# Patient Record
Sex: FEMALE | Race: BLACK OR AFRICAN AMERICAN | Employment: UNEMPLOYED | URBAN - METROPOLITAN AREA
[De-identification: names, ages, dates, MRNs, and addresses within clinical notes are randomized per-mention and may not be internally consistent; named-entity substitution may affect disease eponyms.]

---

## 2023-08-31 ENCOUNTER — HOSPITAL ENCOUNTER (EMERGENCY)
Age: 58
Discharge: HOME | End: 2023-08-31
Attending: EMERGENCY MEDICINE
Payer: MEDICAID

## 2023-08-31 ENCOUNTER — APPOINTMENT (OUTPATIENT)
Dept: GENERAL RADIOLOGY | Age: 58
End: 2023-08-31
Payer: MEDICAID

## 2023-08-31 ENCOUNTER — APPOINTMENT (OUTPATIENT)
Dept: VASCULAR LAB | Age: 58
End: 2023-08-31
Payer: MEDICAID

## 2023-08-31 VITALS
HEIGHT: 65 IN | BODY MASS INDEX: 34.99 KG/M2 | HEART RATE: 80 BPM | SYSTOLIC BLOOD PRESSURE: 135 MMHG | WEIGHT: 210 LBS | DIASTOLIC BLOOD PRESSURE: 86 MMHG | RESPIRATION RATE: 16 BRPM | OXYGEN SATURATION: 96 % | TEMPERATURE: 98.1 F

## 2023-08-31 DIAGNOSIS — M79.604 RIGHT LEG PAIN: Primary | ICD-10-CM

## 2023-08-31 DIAGNOSIS — K08.89 PAIN, DENTAL: ICD-10-CM

## 2023-08-31 LAB — D DIMER PPP FEU-MCNC: 0.5 UG/ML FEU (ref 0–0.59)

## 2023-08-31 PROCEDURE — 73610 X-RAY EXAM OF ANKLE: CPT

## 2023-08-31 PROCEDURE — 36415 COLL VENOUS BLD VENIPUNCTURE: CPT

## 2023-08-31 PROCEDURE — 85379 FIBRIN DEGRADATION QUANT: CPT

## 2023-08-31 PROCEDURE — 93971 EXTREMITY STUDY: CPT

## 2023-08-31 PROCEDURE — 99284 EMERGENCY DEPT VISIT MOD MDM: CPT

## 2023-08-31 RX ORDER — CLINDAMYCIN HYDROCHLORIDE 150 MG/1
300 CAPSULE ORAL 3 TIMES DAILY
Qty: 60 CAPSULE | Refills: 0 | Status: SHIPPED | OUTPATIENT
Start: 2023-08-31 | End: 2023-09-10

## 2023-08-31 ASSESSMENT — PAIN SCALES - GENERAL: PAINLEVEL_OUTOF10: 5

## 2023-08-31 ASSESSMENT — ENCOUNTER SYMPTOMS
SORE THROAT: 0
SHORTNESS OF BREATH: 0
TROUBLE SWALLOWING: 0
COLOR CHANGE: 0
VOMITING: 0
BACK PAIN: 0
NAUSEA: 0
FACIAL SWELLING: 0

## 2023-08-31 ASSESSMENT — PAIN DESCRIPTION - LOCATION: LOCATION: LEG;TEETH

## 2023-08-31 ASSESSMENT — PAIN DESCRIPTION - FREQUENCY: FREQUENCY: INTERMITTENT

## 2023-08-31 ASSESSMENT — PAIN - FUNCTIONAL ASSESSMENT: PAIN_FUNCTIONAL_ASSESSMENT: 0-10

## 2023-08-31 ASSESSMENT — PAIN DESCRIPTION - DESCRIPTORS: DESCRIPTORS: SHARP

## 2023-08-31 NOTE — ED PROVIDER NOTES
[facility] ED  eMERGENCY dEPARTMENT eNCOUnter      Pt Name: Ashley Kelly  MRN: [id number]  [address] [facility] 1965  Date of evaluation: 8/31/2023  Provider: ELLIE Zurita CNP    CHIEF COMPLAINT       Chief Complaint   Patient presents with    Leg Pain     Right lower leg    Dental Pain     Dentist  appt in Dec         HISTORY OF PRESENT ILLNESS  (Location/Symptom, Timing/Onset, Context/Setting, Quality, Duration, Modifying Factors, Severity.)   Ashley Kelly is a 59 y.o. female who presents to the emergency department. C/o pain to her right lower leg, right ankle. Onset was 1-2 days ago. States she felt a sharp pain. Also reports dental pain; she is concerned about an infection and is requesting a prescription for clindamycin. Denies fever, chills, injury, SOB, difficulty swallowing. Denies CP, dizziness. Rates her pain 5/10. Nursing Notes were reviewed. ALLERGIES     Keflet [cephalexin] and Toradol [ketorolac tromethamine]    CURRENT MEDICATIONS       Previous Medications    ACETAMINOPHEN 650 MG TABS    Take 650 mg by mouth every 6 hours as needed for Pain. HYDROCHLOROTHIAZIDE (HYDRODIURIL) 12.5 MG TABLET    Take 1 tablet by mouth daily. MELOXICAM (MOBIC PO)    Take by mouth daily       PAST MEDICAL HISTORY         Diagnosis Date    Arthritis        SURGICAL HISTORY           Procedure Laterality Date    FACIAL RECONSTRUCTION SURGERY      KNEE SURGERY Right     SHOULDER SURGERY Left          FAMILY HISTORY     History reviewed. No pertinent family history. No family status information on file. SOCIAL HISTORY      reports that she has been smoking cigarettes. She has never been exposed to tobacco smoke. She does not have any smokeless tobacco history on file. She reports current alcohol use. She reports that she does not use drugs.     REVIEW OF SYSTEMS    (2-9 systems for level 4, 10 or more for level 5)     Review of Systems   Constitutional:  Negative for chills,

## 2023-08-31 NOTE — ED NOTES
Patient presents to ED with right lower, posterior leg pain below her calf, concerned for clot as she had a right knee arthroplasty a little while ago. No severe pain to leg on palpation and no notable swelling or redness. Patient also c/o dental pain, has a decayed tooth and cannot get into new dentist until December. Patient states her family NP had put her on a course of Clindamycin, but she has completed that and tooth is still painful.       Asif Fisher., RN  85/91/87 [id number]

## 2023-08-31 NOTE — ED PROVIDER NOTES
EMERGENCY DEPARTMENT ENCOUNTER   ATTENDING ATTESTATION     Pt Name: Kevan Rivera  MRN: [id number]  [address] [facility] [place] 1965  Date of evaluation: 8/31/23   Kevan Rivera is a 59 y.o. female with CC: Leg Pain (Right lower leg) and Dental Pain (Dentist  appt in Dec)    MDM:   Patient is a 26-year-old female who presented to the emergency department with right leg pain and dental pain. DVT negative on ultrasound. X-ray also negative. Discharged with outpatient follow-up and parameters to return to the emergency room or  This visit was performed by both a physician and an APC. I personally evaluated and examined the patient. I performed all aspects of the MDM as documented. CRITICAL CARE:       EKG: All EKG's are interpreted by the Emergency Department Physician who either signs or Co-signs this chart in the absence of a cardiologist.      RADIOLOGY:All plain film, CT, MRI, and formal ultrasound images (except ED bedside ultrasound) are read by the radiologist, see reports below, unless otherwise noted in MDM or here. Vascular duplex lower extremity venous right         XR ANKLE RIGHT (MIN 3 VIEWS)   Preliminary Result   No acute fracture, dislocation or malalignment. Mild soft tissue swelling   over the lateral malleolus. Ankle mortise normal and symmetrical.  Plantar   calcaneal spur. LABS: All lab results were reviewed by myself, and all abnormals are listed below. Labs Reviewed   D-DIMER, QUANTITATIVE     CONSULTS:  None  FINAL IMPRESSION      1. Right leg pain    2. Pain, dental            PASTMEDICAL HISTORY     Past Medical History:   Diagnosis Date    Arthritis      SURGICAL HISTORY       Past Surgical History:   Procedure Laterality Date    FACIAL RECONSTRUCTION SURGERY      KNEE SURGERY Right     SHOULDER SURGERY Left      CURRENT MEDICATIONS       Previous Medications    ACETAMINOPHEN 650 MG TABS    Take 650 mg by mouth every 6 hours as needed for Pain.     HYDROCHLOROTHIAZIDE

## 2023-09-01 LAB — ECHO BSA: 2.09 M2

## 2023-10-03 ENCOUNTER — HOSPITAL ENCOUNTER (EMERGENCY)
Age: 58
Discharge: HOME | End: 2023-10-03
Attending: EMERGENCY MEDICINE
Payer: MEDICAID

## 2023-10-03 ENCOUNTER — APPOINTMENT (OUTPATIENT)
Dept: GENERAL RADIOLOGY | Age: 58
End: 2023-10-03
Payer: MEDICAID

## 2023-10-03 VITALS
OXYGEN SATURATION: 97 % | RESPIRATION RATE: 16 BRPM | HEART RATE: 84 BPM | TEMPERATURE: 98.1 F | SYSTOLIC BLOOD PRESSURE: 142 MMHG | WEIGHT: 209 LBS | BODY MASS INDEX: 34.78 KG/M2 | DIASTOLIC BLOOD PRESSURE: 92 MMHG

## 2023-10-03 DIAGNOSIS — M25.512 LEFT SHOULDER PAIN, UNSPECIFIED CHRONICITY: Primary | ICD-10-CM

## 2023-10-03 PROCEDURE — 73030 X-RAY EXAM OF SHOULDER: CPT

## 2023-10-03 PROCEDURE — 99283 EMERGENCY DEPT VISIT LOW MDM: CPT

## 2023-10-03 PROCEDURE — 6370000000 HC RX 637 (ALT 250 FOR IP): Performed by: NURSE PRACTITIONER

## 2023-10-03 PROCEDURE — 6360000002 HC RX W HCPCS: Performed by: NURSE PRACTITIONER

## 2023-10-03 RX ORDER — NAPROXEN 250 MG/1
375 TABLET ORAL ONCE
Status: COMPLETED | OUTPATIENT
Start: 2023-10-03 | End: 2023-10-03

## 2023-10-03 RX ORDER — HYDROCODONE BITARTRATE AND ACETAMINOPHEN 5; 325 MG/1; MG/1
1 TABLET ORAL EVERY 8 HOURS PRN
Qty: 9 TABLET | Refills: 0 | Status: SHIPPED | OUTPATIENT
Start: 2023-10-03 | End: 2023-10-06

## 2023-10-03 RX ADMIN — NAPROXEN 375 MG: 250 TABLET ORAL at 14:27

## 2023-10-03 RX ADMIN — DEXAMETHASONE 6 MG: 2 TABLET ORAL at 14:27

## 2023-10-03 ASSESSMENT — PAIN DESCRIPTION - FREQUENCY: FREQUENCY: CONTINUOUS

## 2023-10-03 ASSESSMENT — ENCOUNTER SYMPTOMS
COLOR CHANGE: 0
SHORTNESS OF BREATH: 0

## 2023-10-03 ASSESSMENT — PAIN - FUNCTIONAL ASSESSMENT: PAIN_FUNCTIONAL_ASSESSMENT: 0-10

## 2023-10-03 ASSESSMENT — PAIN SCALES - GENERAL: PAINLEVEL_OUTOF10: 10

## 2023-10-03 ASSESSMENT — PAIN DESCRIPTION - ORIENTATION: ORIENTATION: RIGHT

## 2023-10-03 ASSESSMENT — PAIN DESCRIPTION - LOCATION: LOCATION: SHOULDER

## 2023-10-03 ASSESSMENT — PAIN DESCRIPTION - DESCRIPTORS: DESCRIPTORS: SHARP

## 2023-10-03 NOTE — ED NOTES
Pt to er with c/o left shoulder pain. Pt states she has to have her rotator cuff fixed. Pt states she has old injury from 15 years ago to shoulder. Pt has swelli\ng noted to left shoulder. Pt states she always has some swelling but it is worse. Pt denies new known injury. Pt a&ox3. Skin warm and dry. Respirations even and non-labored.        Juan Manuel Kimbrough RN  10/03/23 [id number]

## 2024-05-19 ENCOUNTER — HOSPITAL ENCOUNTER (EMERGENCY)
Age: 59
Discharge: HOME | End: 2024-05-19
Attending: EMERGENCY MEDICINE
Payer: MEDICAID

## 2024-05-19 VITALS
SYSTOLIC BLOOD PRESSURE: 127 MMHG | TEMPERATURE: 98.9 F | OXYGEN SATURATION: 97 % | HEART RATE: 85 BPM | RESPIRATION RATE: 12 BRPM | DIASTOLIC BLOOD PRESSURE: 80 MMHG

## 2024-05-19 DIAGNOSIS — R09.81 NASAL CONGESTION: Primary | ICD-10-CM

## 2024-05-19 PROCEDURE — 99283 EMERGENCY DEPT VISIT LOW MDM: CPT

## 2024-05-19 RX ORDER — FLUTICASONE PROPIONATE 50 MCG
1 SPRAY, SUSPENSION (ML) NASAL DAILY
Qty: 32 G | Refills: 0 | Status: SHIPPED | OUTPATIENT
Start: 2024-05-19 | End: 2024-05-26

## 2024-05-19 ASSESSMENT — PAIN SCALES - GENERAL: PAINLEVEL_OUTOF10: 4

## 2024-05-19 ASSESSMENT — PAIN - FUNCTIONAL ASSESSMENT: PAIN_FUNCTIONAL_ASSESSMENT: 0-10

## 2024-05-19 NOTE — ED PROVIDER NOTES
[facility] ED  eMERGENCY dEPARTMENT eNCOUnter      Pt Name: Maggi Cadet  MRN: [id number]  Birthdate 1965  Date of evaluation: 5/19/2024  Provider: ELLIE Fernandez CNP    CHIEF COMPLAINT       Chief Complaint   Patient presents with    Nasal Congestion    Cough         HISTORY OF PRESENT ILLNESS  (Location/Symptom, Timing/Onset, Context/Setting, Quality, Duration, Modifying Factors, Severity.)   Maggi Cadet is a 58 y.o. female who presents to the emergency department for evaluation of nasal congestion rhinorrhea and a cough that started this morning.  Patient states she had her daughters last night and had the air conditioning blowing on her which may have caused her symptoms.  No chest pain or shortness of breath.  Initially she had a little sore throat but that subsided.      Nursing Notes were reviewed.    ALLERGIES     Keflet [cephalexin] and Toradol [ketorolac tromethamine]    CURRENT MEDICATIONS       Previous Medications    ACETAMINOPHEN 650 MG TABS    Take 650 mg by mouth every 6 hours as needed for Pain.    AMOXICILLIN PO    Take by mouth    HYDROCHLOROTHIAZIDE (HYDRODIURIL) 12.5 MG TABLET    Take 1 tablet by mouth daily.    MELOXICAM (MOBIC PO)    Take by mouth daily       PAST MEDICAL HISTORY         Diagnosis Date    Arthritis        SURGICAL HISTORY           Procedure Laterality Date    FACIAL RECONSTRUCTION SURGERY      KNEE SURGERY Right     SHOULDER SURGERY Left          FAMILY HISTORY     History reviewed. No pertinent family history.  No family status information on file.        SOCIAL HISTORY      reports that she has been smoking cigarettes. She has never been exposed to tobacco smoke. She does not have any smokeless tobacco history on file. She reports current alcohol use. She reports that she does not use drugs.    REVIEW OF SYSTEMS    (2-9 systems for level 4, 10 or more for level 5)   Review of Systems   HENT:  Positive for congestion, rhinorrhea and sinus

## 2024-05-19 NOTE — ED NOTES
Here today with nasal congestion, sore throat, and productive cough of thick green secretions. Denies nausea, vomiting, diarrhea, fever, chills, chest pain, SOB, nor dyspnea. Has history of bronchitis. Last use of inhaler one month ago. Notes sore throat \"with my mouth feeling yucky, as if I have a bacterial infection. My right upper tooth in front of molars had work done by a dentist two months ago.I was supposed to get a cap, but he hurt my mouth and I just couldn't go back. Tooth has a filled area over it.\"

## 2024-05-19 NOTE — DISCHARGE INSTRUCTIONS
Take medication as prescribed.  Follow-up with your primary care provider.  Return to emergency department for worsening symptoms

## 2024-05-22 ENCOUNTER — APPOINTMENT (OUTPATIENT)
Dept: GENERAL RADIOLOGY | Age: 59
End: 2024-05-22
Payer: MEDICAID

## 2024-05-22 ENCOUNTER — HOSPITAL ENCOUNTER (EMERGENCY)
Age: 59
Discharge: HOME | End: 2024-05-22
Attending: EMERGENCY MEDICINE
Payer: MEDICAID

## 2024-05-22 VITALS
WEIGHT: 211 LBS | HEART RATE: 90 BPM | HEIGHT: 65 IN | RESPIRATION RATE: 20 BRPM | BODY MASS INDEX: 35.16 KG/M2 | SYSTOLIC BLOOD PRESSURE: 144 MMHG | OXYGEN SATURATION: 96 % | TEMPERATURE: 98.6 F | DIASTOLIC BLOOD PRESSURE: 102 MMHG

## 2024-05-22 DIAGNOSIS — J06.9 ACUTE UPPER RESPIRATORY INFECTION: Primary | ICD-10-CM

## 2024-05-22 LAB
FLUAV RNA RESP QL NAA+PROBE: NOT DETECTED
FLUBV RNA RESP QL NAA+PROBE: NOT DETECTED
SARS-COV-2 RNA RESP QL NAA+PROBE: NOT DETECTED
SOURCE: NORMAL
SPECIMEN DESCRIPTION: NORMAL
SPECIMEN SOURCE: NORMAL
STREP A, MOLECULAR: NEGATIVE

## 2024-05-22 PROCEDURE — 99284 EMERGENCY DEPT VISIT MOD MDM: CPT

## 2024-05-22 PROCEDURE — 71045 X-RAY EXAM CHEST 1 VIEW: CPT

## 2024-05-22 PROCEDURE — 87651 STREP A DNA AMP PROBE: CPT

## 2024-05-22 PROCEDURE — 87636 SARSCOV2 & INF A&B AMP PRB: CPT

## 2024-05-22 ASSESSMENT — ENCOUNTER SYMPTOMS
BACK PAIN: 0
CHEST TIGHTNESS: 0
VOICE CHANGE: 0
COUGH: 1
NAUSEA: 0
TROUBLE SWALLOWING: 0
SHORTNESS OF BREATH: 0
EYE PAIN: 0
ABDOMINAL PAIN: 0
FACIAL SWELLING: 0
VOMITING: 0
PHOTOPHOBIA: 0
COLOR CHANGE: 0

## 2024-05-22 ASSESSMENT — PAIN - FUNCTIONAL ASSESSMENT: PAIN_FUNCTIONAL_ASSESSMENT: 0-10

## 2024-05-22 ASSESSMENT — PAIN SCALES - GENERAL: PAINLEVEL_OUTOF10: 6

## 2024-05-22 NOTE — ED PROVIDER NOTES
EMERGENCY DEPARTMENT ENCOUNTER    Pt Name: Maggi Cadet  MRN: [id number]  Birthdate 1965  Date of evaluation: 5/22/24  CHIEF COMPLAINT       Chief Complaint   Patient presents with    Nasal Congestion     X4 days. Came here and was given nasal spray which helped slightly, but feels she needs antibiotics for bronchitis.     Pharyngitis     HISTORY OF PRESENT ILLNESS   58-year-old female presenting to the ER complaining of nasal congestion and a cough producing phlegm since Sunday.  Patient states she was in the ER on Sunday and states that it has not gotten better since then.  Patient states the nurse and her preop for surgery that she was supposed to have done today told her that she needed an antibiotic.  She was not however prescribed an antibiotic.    The history is provided by the patient.   URI  Presenting symptoms: congestion and cough    Presenting symptoms: no fatigue    Associated symptoms: no arthralgias and no headaches            REVIEW OF SYSTEMS     Review of Systems   Constitutional:  Negative for activity change, appetite change and fatigue.   HENT:  Positive for congestion. Negative for facial swelling, trouble swallowing and voice change.    Eyes:  Negative for photophobia and pain.   Respiratory:  Positive for cough. Negative for chest tightness and shortness of breath.    Cardiovascular:  Negative for chest pain and palpitations.   Gastrointestinal:  Negative for abdominal pain, nausea and vomiting.   Genitourinary:  Negative for dysuria and urgency.   Musculoskeletal:  Negative for arthralgias and back pain.   Skin:  Negative for color change and rash.   Neurological:  Negative for dizziness, syncope and headaches.   Psychiatric/Behavioral:  Negative for behavioral problems and hallucinations.      PASTMEDICAL HISTORY     Past Medical History:   Diagnosis Date    Arthritis      Past Problem List  There is no problem list on file for this patient.    SURGICAL HISTORY       Past Surgical

## 2024-05-24 NOTE — ED PROVIDER NOTES
Pt Name: Maggi Cadet  MRN: [id number]  Birthdate 1965  Date of evaluation: 5/24/24   Maggi Cadet is a 58 y.o. female with CC: Nasal Congestion and Cough    MDM:   I performed a substantive part of the MDM during the patient's E/M visit. I personally made or approved the documented management plan and acknowledge its risk of complications.           CRITICAL CARE:       EKG: All EKG's are interpreted by the Emergency Department Physician who either signs or Co-signs this chart in the absence of a cardiologist.      RADIOLOGY:All plain film, CT, MRI, and formal ultrasound images (except ED bedside ultrasound) are read by the radiologist, see reports below, unless otherwise noted in MDM or here.  No orders to display     LABS: All lab results were reviewed by myself, and all abnormals are listed below.  Labs Reviewed - No data to display  CONSULTS:  None  FINAL IMPRESSION      1. Nasal congestion            PASTMEDICAL HISTORY     Past Medical History:   Diagnosis Date    Arthritis      SURGICAL HISTORY       Past Surgical History:   Procedure Laterality Date    FACIAL RECONSTRUCTION SURGERY      KNEE SURGERY Right     SHOULDER SURGERY Left      CURRENT MEDICATIONS       Discharge Medication List as of 5/19/2024  7:25 PM        CONTINUE these medications which have NOT CHANGED    Details   AMOXICILLIN PO Take by mouthHistorical Med      Meloxicam (MOBIC PO) Take by mouth dailyHistorical Med      acetaminophen 650 MG TABS Take 650 mg by mouth every 6 hours as needed for Pain., Disp-120 tablet, R-3      hydrochlorothiazide (HYDRODIURIL) 12.5 MG tablet Take 1 tablet by mouth daily., Disp-30 tablet, R-3           ALLERGIES     is allergic to keflet [cephalexin] and toradol [ketorolac tromethamine].  FAMILY HISTORY     has no family status information on file.      SOCIAL HISTORY       Social History     Tobacco Use    Smoking status: Some Days     Types: Cigarettes     Passive exposure: Never   Vaping Use

## 2024-10-18 ENCOUNTER — HOSPITAL ENCOUNTER (EMERGENCY)
Age: 59
Discharge: HOME | End: 2024-10-18
Attending: STUDENT IN AN ORGANIZED HEALTH CARE EDUCATION/TRAINING PROGRAM
Payer: MEDICAID

## 2024-10-18 VITALS
RESPIRATION RATE: 17 BRPM | WEIGHT: 220 LBS | HEIGHT: 65 IN | OXYGEN SATURATION: 96 % | TEMPERATURE: 97.3 F | BODY MASS INDEX: 36.65 KG/M2 | DIASTOLIC BLOOD PRESSURE: 83 MMHG | HEART RATE: 84 BPM | SYSTOLIC BLOOD PRESSURE: 153 MMHG
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
## 2024-10-18 PROCEDURE — 90715 TDAP VACCINE 7 YRS/> IM: CPT | Performed by: STUDENT IN AN ORGANIZED HEALTH CARE EDUCATION/TRAINING PROGRAM

## 2024-10-18 PROCEDURE — 6360000002 HC RX W HCPCS: Performed by: STUDENT IN AN ORGANIZED HEALTH CARE EDUCATION/TRAINING PROGRAM

## 2024-10-18 PROCEDURE — 99284 EMERGENCY DEPT VISIT MOD MDM: CPT

## 2024-10-18 PROCEDURE — 90471 IMMUNIZATION ADMIN: CPT | Performed by: STUDENT IN AN ORGANIZED HEALTH CARE EDUCATION/TRAINING PROGRAM

## 2024-10-18 PROCEDURE — 6370000000 HC RX 637 (ALT 250 FOR IP): Performed by: STUDENT IN AN ORGANIZED HEALTH CARE EDUCATION/TRAINING PROGRAM

## 2024-10-18 RX ORDER — MELOXICAM 7.5 MG/1
15 TABLET ORAL ONCE
Status: COMPLETED | OUTPATIENT
Start: 2024-10-18 | End: 2024-10-18

## 2024-10-18 RX ADMIN — TETANUS TOXOID, REDUCED DIPHTHERIA TOXOID AND ACELLULAR PERTUSSIS VACCINE, ADSORBED 0.5 ML: 5; 2.5; 8; 8; 2.5 SUSPENSION INTRAMUSCULAR at 20:10

## 2024-10-18 ASSESSMENT — PAIN SCALES - GENERAL: PAINLEVEL_OUTOF10: 8

## 2024-10-18 ASSESSMENT — PAIN DESCRIPTION - FREQUENCY: FREQUENCY: CONTINUOUS

## 2024-10-18 ASSESSMENT — PAIN DESCRIPTION - LOCATION: LOCATION: ARM

## 2024-10-18 ASSESSMENT — PAIN DESCRIPTION - PAIN TYPE: TYPE: ACUTE PAIN

## 2024-10-18 ASSESSMENT — PAIN DESCRIPTION - DESCRIPTORS: DESCRIPTORS: THROBBING

## 2024-10-18 ASSESSMENT — PAIN - FUNCTIONAL ASSESSMENT: PAIN_FUNCTIONAL_ASSESSMENT: 0-10

## 2024-10-18 ASSESSMENT — PAIN DESCRIPTION - ORIENTATION: ORIENTATION: RIGHT

## 2024-10-18 NOTE — ED PROVIDER NOTES
[facility] EMERGENCY DEPARTMENT ENCOUNTER      Pt Name: Maggi Cadet  MRN: [id number]  Birthdate 1965  Date of evaluation: 10/18/24    CHIEF COMPLAINT       Chief Complaint   Patient presents with    Animal Bite     ARM BITE TODAY ON RIGHT UPPER INNER ARM     Leg Swelling     RIGHT ANKLE SWOLLEN DID NOT INJURE        HISTORY OF PRESENT ILLNESS   Maggi Cadet is a 59 y.o. female who presents with right upper arm dog bite.  Right ankle swelling as well.  She states she has not been compliant with her hydrochlorothiazide.  Unknown last tetanus.    PASTMEDICAL HISTORY     Past Medical History:   Diagnosis Date    Arthritis      Past Problem List  There is no problem list on file for this patient.      SURGICAL HISTORY       Past Surgical History:   Procedure Laterality Date    FACIAL RECONSTRUCTION SURGERY      KNEE SURGERY Right     SHOULDER SURGERY Left        CURRENT MEDICATIONS       Previous Medications    ACETAMINOPHEN 650 MG TABS    Take 650 mg by mouth every 6 hours as needed for Pain.    FLUTICASONE (FLONASE) 50 MCG/ACT NASAL SPRAY    1 spray by Each Nostril route daily for 7 days    HYDROCHLOROTHIAZIDE (HYDRODIURIL) 12.5 MG TABLET    Take 1 tablet by mouth daily.    MELOXICAM (MOBIC PO)    Take by mouth daily       ALLERGIES     is allergic to keflet [cephalexin] and toradol [ketorolac tromethamine].    FAMILY HISTORY     has no family status information on file.        SOCIAL HISTORY       Social History     Tobacco Use    Smoking status: Some Days     Types: Cigarettes     Passive exposure: Never   Vaping Use    Vaping status: Never Used   Substance Use Topics    Alcohol use: Yes     Comment: social    Drug use: No     Comment: former cocaine and marijuana 6yrs clean        PHYSICAL EXAM     INITIAL VITALS: BP (!) 153/83   Pulse 84   Temp 97.3 °F (36.3 °C) (Oral)   Resp 17   Ht 1.651 m (5' 5\")   Wt 99.8 kg (220 lb)   SpO2 96%   BMI 36.61 kg/m²    Physical Exam  Vitals and nursing note  with primary care physician and/or return to the emergency department should they experience a change or worsening of symptoms.  Patient will be discharged with resources: summary of visit, instructions, follow-up information, prescriptions if necessary.  Patient/ family instructed to read discharge paperwork. All of their questions and concerns were addressed.   Suspicion for any acute life-threatening processes is low.   Patient voices understanding of plan.        DATA FOR LAB AND RADIOLOGY TESTS ORDERED BELOW ARE REVIEWED BY THE ED CLINICIAN:    RADIOLOGY: All x-rays, CT, MRI, and formal ultrasound images (except ED bedside ultrasound) are read by the radiologist, see reports below, unless otherwise noted in MDM or here.  Reports below are reviewed by myself.  No orders to display       LABS: Lab orders shown below, the results are reviewed by myself, and all abnormals are listed below.  Labs Reviewed - No data to display    Vitals Reviewed:    Vitals:    10/18/24 1819   BP: (!) 153/83   Pulse: 84   Resp: 17   Temp: 97.3 °F (36.3 °C)   TempSrc: Oral   SpO2: 96%   Weight: 99.8 kg (220 lb)   Height: 1.651 m (5' 5\")     MEDICATIONS GIVEN TO PATIENT THIS ENCOUNTER:  Orders Placed This Encounter   Medications    amoxicillin-clavulanate (AUGMENTIN) 875-125 MG per tablet 1 tablet     Order Specific Question:   Antimicrobial Indications     Answer:   Skin and Soft Tissue Infection    tetanus-diphth-acell pertussis (BOOSTRIX) injection 0.5 mL    meloxicam (MOBIC) tablet 15 mg     DISCHARGE PRESCRIPTIONS:  New Prescriptions    No medications on file     PHYSICIAN CONSULTS ORDERED THIS ENCOUNTER:  None    ED Course Notes From Epic Narrator:         CRITICAL CARE:   0    PROCEDURES:  none    FINAL IMPRESSION      1. Dog bite, initial encounter          DISPOSITION/PLAN   DISPOSITION Discharge - Pending Orders Complete 10/18/2024 07:46:20 PM  Condition at Disposition: Data Unavailable      OUTPATIENT FOLLOW UP THE

## 2025-08-14 ENCOUNTER — OFFICE VISIT (OUTPATIENT)
Dept: FAMILY MEDICINE CLINIC | Age: 60
End: 2025-08-14

## 2025-08-14 VITALS
SYSTOLIC BLOOD PRESSURE: 124 MMHG | DIASTOLIC BLOOD PRESSURE: 74 MMHG | HEART RATE: 82 BPM | WEIGHT: 201 LBS | BODY MASS INDEX: 33.49 KG/M2 | HEIGHT: 65 IN | OXYGEN SATURATION: 95 %

## 2025-08-14 DIAGNOSIS — G89.29 CHRONIC LEFT SHOULDER PAIN: ICD-10-CM

## 2025-08-14 DIAGNOSIS — F32.A ANXIETY AND DEPRESSION: ICD-10-CM

## 2025-08-14 DIAGNOSIS — Z12.11 COLON CANCER SCREENING: ICD-10-CM

## 2025-08-14 DIAGNOSIS — Z00.00 ENCOUNTER FOR MEDICAL EXAMINATION TO ESTABLISH CARE: Primary | ICD-10-CM

## 2025-08-14 DIAGNOSIS — F41.9 ANXIETY AND DEPRESSION: ICD-10-CM

## 2025-08-14 DIAGNOSIS — E55.9 VITAMIN D DEFICIENCY: ICD-10-CM

## 2025-08-14 DIAGNOSIS — I10 ESSENTIAL HYPERTENSION: ICD-10-CM

## 2025-08-14 DIAGNOSIS — M25.512 CHRONIC LEFT SHOULDER PAIN: ICD-10-CM

## 2025-08-14 DIAGNOSIS — Z12.31 ENCOUNTER FOR SCREENING MAMMOGRAM FOR BREAST CANCER: ICD-10-CM

## 2025-08-14 DIAGNOSIS — Z13.6 ENCOUNTER FOR SCREENING FOR CARDIOVASCULAR DISORDERS: ICD-10-CM

## 2025-08-14 DIAGNOSIS — Z13.1 DIABETES MELLITUS SCREENING: ICD-10-CM

## 2025-08-14 DIAGNOSIS — M25.552 LEFT HIP PAIN: ICD-10-CM

## 2025-08-14 RX ORDER — DOCOSANOL 100 MG/G
2 CREAM TOPICAL
COMMUNITY
Start: 2025-08-14 | End: 2025-08-24

## 2025-08-14 RX ORDER — MELOXICAM 15 MG/1
15 TABLET ORAL DAILY
COMMUNITY
Start: 2025-08-07

## 2025-08-14 RX ORDER — METHOCARBAMOL 750 MG/1
50000 TABLET ORAL WEEKLY
Qty: 12 CAPSULE | Refills: 1 | Status: SHIPPED | OUTPATIENT
Start: 2025-08-14

## 2025-08-14 RX ORDER — HYDROCHLOROTHIAZIDE 12.5 MG/1
12.5 TABLET ORAL DAILY
Qty: 90 TABLET | Refills: 1 | Status: SHIPPED | OUTPATIENT
Start: 2025-08-14

## 2025-08-15 PROBLEM — F32.A ANXIETY AND DEPRESSION: Status: ACTIVE | Noted: 2025-08-15

## 2025-08-15 PROBLEM — E55.9 VITAMIN D DEFICIENCY: Status: ACTIVE | Noted: 2025-08-15

## 2025-08-15 PROBLEM — I10 ESSENTIAL HYPERTENSION: Status: ACTIVE | Noted: 2025-08-15

## 2025-08-15 PROBLEM — G89.29 CHRONIC LEFT SHOULDER PAIN: Status: ACTIVE | Noted: 2025-08-15

## 2025-08-15 PROBLEM — G62.9 NEUROPATHY: Status: ACTIVE | Noted: 2025-08-15

## 2025-08-15 PROBLEM — F41.9 ANXIETY AND DEPRESSION: Status: ACTIVE | Noted: 2025-08-15

## 2025-08-15 PROBLEM — M25.512 CHRONIC LEFT SHOULDER PAIN: Status: ACTIVE | Noted: 2025-08-15
